# Patient Record
Sex: MALE | Race: OTHER | URBAN - METROPOLITAN AREA
[De-identification: names, ages, dates, MRNs, and addresses within clinical notes are randomized per-mention and may not be internally consistent; named-entity substitution may affect disease eponyms.]

---
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
## 2025-01-23 ENCOUNTER — HOSPITAL ENCOUNTER (OUTPATIENT)
Age: 20
Discharge: HOME | End: 2025-01-23
Attending: EMERGENCY MEDICINE
Payer: MEDICAID

## 2025-01-23 VITALS
HEART RATE: 72 BPM | HEIGHT: 70 IN | RESPIRATION RATE: 18 BRPM | WEIGHT: 148 LBS | OXYGEN SATURATION: 99 % | TEMPERATURE: 98 F | SYSTOLIC BLOOD PRESSURE: 116 MMHG | BODY MASS INDEX: 21.19 KG/M2 | DIASTOLIC BLOOD PRESSURE: 60 MMHG

## 2025-01-23 DIAGNOSIS — L03.019 ONYCHIA AND PARONYCHIA OF FINGER: Primary | ICD-10-CM

## 2025-01-23 PROCEDURE — 99212 OFFICE O/P EST SF 10 MIN: CPT

## 2025-01-23 PROCEDURE — 10060 I&D ABSCESS SIMPLE/SINGLE: CPT

## 2025-01-23 PROCEDURE — 99213 OFFICE O/P EST LOW 20 MIN: CPT

## 2025-01-23 RX ORDER — TRETINOIN 0.1 MG/G
1 GEL TOPICAL DAILY
COMMUNITY
Start: 2024-01-10

## 2025-01-23 RX ORDER — ESCITALOPRAM OXALATE 5 MG/1
TABLET ORAL
COMMUNITY
Start: 2025-01-13 | End: 2025-02-12

## 2025-01-23 RX ORDER — TAZAROTENE 1 MG/G
CREAM TOPICAL
COMMUNITY
Start: 2024-09-30

## 2025-01-23 RX ORDER — ERYTHROMYCIN AND BENZOYL PEROXIDE 30; 50 MG/G; MG/G
1 GEL TOPICAL DAILY
COMMUNITY
Start: 2024-01-10

## 2025-01-23 RX ORDER — DOXYCYCLINE HYCLATE 100 MG
100 TABLET ORAL 2 TIMES DAILY
COMMUNITY
Start: 2024-09-30

## 2025-01-23 NOTE — DISCHARGE INSTRUCTIONS
Topical bacitracin and bandage.   Warm soaks and massage twice daily  Over the counter ibuprofen for pain

## 2025-01-26 NOTE — ED PROVIDER NOTES
Patient Seen in: [facility]      History     Chief Complaint   Patient presents with    Swelling Edema     Stated Complaint: Infection    Subjective:   HPI      Left finger pain and swelling for four days. Admits he bites his nails. No fever. No numbness.     Objective:     Past Medical History:    ADHD    PKU (phenylketonuria) ([facility])              History reviewed. No pertinent surgical history.             No pertinent social history.            Review of Systems    Positive for stated complaint: Infection  Other systems are as noted in HPI.  Constitutional and vital signs reviewed.      All other systems reviewed and negative except as noted above.    Physical Exam     ED Triage Vitals [01/23/25 1241]   /60   Pulse 72   Resp 18   Temp 97.8 °F (36.6 °C)   Temp src Oral   SpO2 99 %   O2 Device None (Room air)       Current Vitals:   No data recorded    Physical Exam  Vitals and nursing note reviewed.   Constitutional:       Appearance: Normal appearance. He is well-developed.   HENT:      Head: Normocephalic and atraumatic.   Cardiovascular:      Rate and Rhythm: Normal rate and regular rhythm.   Pulmonary:      Effort: Pulmonary effort is normal. No respiratory distress.   Skin:     General: Skin is warm and dry.      Capillary Refill: Capillary refill takes less than 2 seconds.      Comments: Left finger paronychia present. Neurovascular intact.    Neurological:      General: No focal deficit present.      Mental Status: He is alert.   Psychiatric:         Mood and Affect: Mood normal.         Behavior: Behavior normal.            ED Course   Labs Reviewed - No data to display         Finger cleaned. Paronychia incised with 11 blade. Pus expressed. Bandage applied.        MDM             Medical Decision Making  Paronychia drained as above. Patient tolerated well. Discharge on warm soaks and bacitracin.     Disposition and Plan     Clinical Impression:  1. Onychia and paronychia of finger          Disposition:  Discharge  1/23/2025  1:04 pm    Follow-up:  Poncho Conway MD  [address] [place] [address]  [phone]      As needed          Medications Prescribed:  Discharge Medication List as of 1/23/2025  1:08 PM              Supplementary Documentation:

## 2025-06-25 NOTE — LETTER
Date & Time: 2/16/2024, 10:01 AM  Patient: Vinicius Ng  Encounter Provider(s):    Yari Beltran APRN       To Whom It May Concern:    Vinicuis Ng was seen and treated in our department on 2/16/2024. He should not return to work until 2/19/24 .    If you have any questions or concerns, please do not hesitate to call.        _____________________________  Physician/APC Signature            "Patient refusing lyft ride, demanding ambulance transport so \"they van get him inside\".     Temi Patterson RN  06/25/25 [id number]    "